# Patient Record
Sex: MALE | Race: WHITE | Employment: FULL TIME | URBAN - NONMETROPOLITAN AREA
[De-identification: names, ages, dates, MRNs, and addresses within clinical notes are randomized per-mention and may not be internally consistent; named-entity substitution may affect disease eponyms.]

---

## 2020-08-07 ENCOUNTER — OFFICE VISIT (OUTPATIENT)
Dept: PRIMARY CARE CLINIC | Age: 25
End: 2020-08-07
Payer: COMMERCIAL

## 2020-08-07 ENCOUNTER — HOSPITAL ENCOUNTER (OUTPATIENT)
Age: 25
Setting detail: SPECIMEN
Discharge: HOME | End: 2020-08-07
Payer: COMMERCIAL

## 2020-08-07 VITALS
SYSTOLIC BLOOD PRESSURE: 118 MMHG | HEART RATE: 54 BPM | DIASTOLIC BLOOD PRESSURE: 73 MMHG | TEMPERATURE: 98.2 F | BODY MASS INDEX: 24.77 KG/M2 | WEIGHT: 173 LBS | OXYGEN SATURATION: 97 % | HEIGHT: 70 IN

## 2020-08-07 PROCEDURE — G8427 DOCREV CUR MEDS BY ELIG CLIN: HCPCS | Performed by: NURSE PRACTITIONER

## 2020-08-07 PROCEDURE — U0003 INFECTIOUS AGENT DETECTION BY NUCLEIC ACID (DNA OR RNA); SEVERE ACUTE RESPIRATORY SYNDROME CORONAVIRUS 2 (SARS-COV-2) (CORONAVIRUS DISEASE [COVID-19]), AMPLIFIED PROBE TECHNIQUE, MAKING USE OF HIGH THROUGHPUT TECHNOLOGIES AS DESCRIBED BY CMS-2020-01-R: HCPCS

## 2020-08-07 PROCEDURE — 99202 OFFICE O/P NEW SF 15 MIN: CPT | Performed by: NURSE PRACTITIONER

## 2020-08-07 PROCEDURE — 1036F TOBACCO NON-USER: CPT | Performed by: NURSE PRACTITIONER

## 2020-08-07 PROCEDURE — G8420 CALC BMI NORM PARAMETERS: HCPCS | Performed by: NURSE PRACTITIONER

## 2020-08-07 ASSESSMENT — ENCOUNTER SYMPTOMS
EYES NEGATIVE: 1
VOMITING: 0
COUGH: 1
DIARRHEA: 0
SHORTNESS OF BREATH: 1
ALLERGIC/IMMUNOLOGIC NEGATIVE: 1
NAUSEA: 0
SORE THROAT: 1
RHINORRHEA: 1

## 2020-08-07 NOTE — PROGRESS NOTES
[facility]  1995    Chief Complaint   Patient presents with    Generalized Body Aches     Patient presents today with body aches, fever and chills that began last night. Patient's brother was positvie for COVID, results came in yesterday. Respiratory Symptoms:  Patient complains of 1 day(s) history of fever: 100.5-101.9, myalgias/arthralgias, headache, facial pain/sinus pressure: bilateral frontal, sneezing, sore throat, shortness of breath, non-productive cough, lightheadedness, cervical adenopathy, neck pain and dry, scratchy throat. Symptoms have been unchanged with time. He denies any other symptoms . Relevant PMH: No pertinent PMH. Smoking history:  He  has no history on file for tobacco.     He has had ill contacts with COVID 23, patient's brother tested positve. Treatment to date: Motrin, without any improvement    Travel screen completed:   Yes

## 2020-08-07 NOTE — PROGRESS NOTES
[id number]  [id number] [place] [address]  Dept: [phone]  Dept Fax: [phone]    Yovanny Fowler is a 26 y.o. male who presents to the [address] [place] [address] today for his medical conditions/complaints as noted below. Yovanny Fowler is c/o of Generalized Body Aches (Patient presents today with body aches, fever and chills that began last night. Patient's brother was positvie for COVID, results came in yesterday. )      HPI:    Yovanny Fowler is a 26 y.o. male who presents with  Body aches, sore throat, shortness of breath, body aches, congestion, fatigue, and headaches. High fever was 101.0 this morning. Brother tested positive this week. Has been taking Motrin. History reviewed. No pertinent past medical history. Current Outpatient Medications   Medication Sig Dispense Refill    Lisdexamfetamine Dimesylate 40 MG CAPS Take 40 mg by mouth daily. No current facility-administered medications for this visit. Allergies   Allergen Reactions    Other Other (See Comments)     Pollen, stuffy nose and sneeze       Subjective:      Review of Systems   Constitutional: Positive for chills, diaphoresis, fatigue and fever. Negative for appetite change. HENT: Positive for rhinorrhea and sore throat. Eyes: Negative. Respiratory: Positive for cough and shortness of breath. Cardiovascular: Negative. Gastrointestinal: Negative for diarrhea, nausea and vomiting. Endocrine: Negative. Genitourinary: Negative. Musculoskeletal: Positive for myalgias. Skin: Negative. Allergic/Immunologic: Negative. Neurological: Positive for headaches. Hematological: Negative. Psychiatric/Behavioral: Negative. Objective:     Physical Exam  Vitals signs and nursing note reviewed. Constitutional:       Appearance: Normal appearance. HENT:      Head: Normocephalic.       Right Ear: External ear normal.      Left Ear: External ear normal.      Nose: Nose normal.      Mouth/Throat:      Mouth: Mucous membranes are moist.      Pharynx: Oropharynx is clear. Eyes:      Conjunctiva/sclera: Conjunctivae normal.      Pupils: Pupils are equal, round, and reactive to light. Neck:      Musculoskeletal: Normal range of motion. Cardiovascular:      Rate and Rhythm: Normal rate and regular rhythm. Heart sounds: Normal heart sounds. Pulmonary:      Effort: Pulmonary effort is normal.      Breath sounds: Normal breath sounds. Musculoskeletal: Normal range of motion. Skin:     General: Skin is warm. Capillary Refill: Capillary refill takes less than 2 seconds. Neurological:      General: No focal deficit present. Mental Status: He is alert. Psychiatric:         Mood and Affect: Mood normal.       /73 (Site: Left Upper Arm, Position: Sitting, Cuff Size: Medium Adult)   Pulse 54   Temp 98.2 °F (36.8 °C)   Ht 5' 10\" (1.778 m)   Wt 173 lb (78.5 kg)   SpO2 97%   BMI 24.82 kg/m²     Assessment:      Diagnosis Orders   1. Suspected COVID-19 virus infection  COVID-19 Ambulatory     No results found for this visit on 08/07/20. Plan:     -Advised to self quarantine for 10 days at home or until receives negative results from COVID-19 test.  -May return to work after negative test results, symptoms resolved, given at least 10     days after symptoms started, and 24 hours after last fever. -Advised they will receive test results in 3-5 days.  -Tylenol as needed for fever and comfort. Avoid taking Ibuprofen. -Increase fluids and rest.  -Warm salt water gargles and hot tea with lemon and honey for sore throat.  -Cool mist humidifier  -If shortness of breath or chest discomfort develop call Emergency Room before arrival for instructions. No follow-ups on file. No orders of the defined types were placed in this encounter.        Electronically signed by ASHISH Ho CNP on 8/7/2020 at 12:36 PM

## 2020-08-09 LAB — SARS-COV-2, NAA: NOT DETECTED

## 2020-08-10 ENCOUNTER — TELEPHONE (OUTPATIENT)
Dept: PRIMARY CARE CLINIC | Age: 25
End: 2020-08-10

## 2020-08-10 NOTE — TELEPHONE ENCOUNTER
----- Message from ASHISH Carrizales CNP sent at 8/10/2020 12:21 PM EDT -----  Please let Eleni Verdugo know that his COVID-19 test returned negative. How is he feeling today? Advise that he may return to work when he is symptom-free and fever free for at least 24 hours without taking Tylenol or ibuprofen and 10 days from onset of symptoms. If he needs a new work note, we can supply and fax to his employer if he would like. Follow-up with PCP as needed or to ER for worsening symptoms.

## 2020-08-10 NOTE — TELEPHONE ENCOUNTER
Attempted to contact patient but wasn't able to speak with him.  I left vm letting him know results and to call [phone] with questions or concerns